# Patient Record
Sex: MALE | Race: ASIAN | Employment: FULL TIME | URBAN - METROPOLITAN AREA
[De-identification: names, ages, dates, MRNs, and addresses within clinical notes are randomized per-mention and may not be internally consistent; named-entity substitution may affect disease eponyms.]

---

## 2018-07-03 ENCOUNTER — OFFICE VISIT (OUTPATIENT)
Dept: INTERNAL MEDICINE CLINIC | Facility: CLINIC | Age: 33
End: 2018-07-03

## 2018-07-03 ENCOUNTER — APPOINTMENT (OUTPATIENT)
Dept: LAB | Facility: HOSPITAL | Age: 33
End: 2018-07-03
Attending: INTERNAL MEDICINE
Payer: COMMERCIAL

## 2018-07-03 VITALS
WEIGHT: 169 LBS | TEMPERATURE: 98 F | BODY MASS INDEX: 23.93 KG/M2 | SYSTOLIC BLOOD PRESSURE: 110 MMHG | HEIGHT: 70.5 IN | RESPIRATION RATE: 17 BRPM | DIASTOLIC BLOOD PRESSURE: 60 MMHG | OXYGEN SATURATION: 98 % | HEART RATE: 76 BPM

## 2018-07-03 DIAGNOSIS — K21.9 GASTROESOPHAGEAL REFLUX DISEASE, ESOPHAGITIS PRESENCE NOT SPECIFIED: ICD-10-CM

## 2018-07-03 DIAGNOSIS — G89.29 CHRONIC LUQ PAIN: Primary | ICD-10-CM

## 2018-07-03 DIAGNOSIS — K58.8 OTHER IRRITABLE BOWEL SYNDROME: ICD-10-CM

## 2018-07-03 DIAGNOSIS — B02.29 POSTHERPETIC NEURALGIA: ICD-10-CM

## 2018-07-03 DIAGNOSIS — R10.12 CHRONIC LUQ PAIN: Primary | ICD-10-CM

## 2018-07-03 PROBLEM — Z86.19 HISTORY OF HERPES ZOSTER: Status: ACTIVE | Noted: 2018-07-03

## 2018-07-03 LAB
ALBUMIN SERPL BCP-MCNC: 4.8 G/DL (ref 3.5–4.8)
ALBUMIN/GLOB SERPL: 2 {RATIO} (ref 1–2)
ALP SERPL-CCNC: 56 U/L (ref 32–100)
ALT SERPL-CCNC: 22 U/L (ref 17–63)
ANION GAP SERPL CALC-SCNC: 10 MMOL/L (ref 0–18)
AST SERPL-CCNC: 25 U/L (ref 15–41)
BASOPHILS # BLD: 0 K/UL (ref 0–0.2)
BASOPHILS NFR BLD: 0 %
BILIRUB SERPL-MCNC: 1.3 MG/DL (ref 0.3–1.2)
BUN SERPL-MCNC: 13 MG/DL (ref 8–20)
BUN/CREAT SERPL: 13.4 (ref 10–20)
CALCIUM SERPL-MCNC: 9.6 MG/DL (ref 8.5–10.5)
CHLORIDE SERPL-SCNC: 104 MMOL/L (ref 95–110)
CO2 SERPL-SCNC: 27 MMOL/L (ref 22–32)
CREAT SERPL-MCNC: 0.97 MG/DL (ref 0.5–1.5)
EOSINOPHIL # BLD: 0.2 K/UL (ref 0–0.7)
EOSINOPHIL NFR BLD: 3 %
ERYTHROCYTE [DISTWIDTH] IN BLOOD BY AUTOMATED COUNT: 13.5 % (ref 11–15)
GLOBULIN PLAS-MCNC: 2.4 G/DL (ref 2.5–3.7)
GLUCOSE SERPL-MCNC: 80 MG/DL (ref 70–99)
HCT VFR BLD AUTO: 46.9 % (ref 41–52)
HGB BLD-MCNC: 15.8 G/DL (ref 13.5–17.5)
LYMPHOCYTES # BLD: 1.8 K/UL (ref 1–4)
LYMPHOCYTES NFR BLD: 33 %
MCH RBC QN AUTO: 30.8 PG (ref 27–32)
MCHC RBC AUTO-ENTMCNC: 33.8 G/DL (ref 32–37)
MCV RBC AUTO: 91.2 FL (ref 80–100)
MONOCYTES # BLD: 0.4 K/UL (ref 0–1)
MONOCYTES NFR BLD: 7 %
NEUTROPHILS # BLD AUTO: 3.2 K/UL (ref 1.8–7.7)
NEUTROPHILS NFR BLD: 57 %
OSMOLALITY UR CALC.SUM OF ELEC: 291 MOSM/KG (ref 275–295)
PATIENT FASTING: NO
PLATELET # BLD AUTO: 198 K/UL (ref 140–400)
PMV BLD AUTO: 8.3 FL (ref 7.4–10.3)
POTASSIUM SERPL-SCNC: 4.5 MMOL/L (ref 3.3–5.1)
PROT SERPL-MCNC: 7.2 G/DL (ref 5.9–8.4)
RBC # BLD AUTO: 5.14 M/UL (ref 4.5–5.9)
SODIUM SERPL-SCNC: 141 MMOL/L (ref 136–144)
WBC # BLD AUTO: 5.6 K/UL (ref 4–11)

## 2018-07-03 PROCEDURE — 36415 COLL VENOUS BLD VENIPUNCTURE: CPT | Performed by: INTERNAL MEDICINE

## 2018-07-03 PROCEDURE — 99204 OFFICE O/P NEW MOD 45 MIN: CPT | Performed by: INTERNAL MEDICINE

## 2018-07-03 PROCEDURE — 83013 H PYLORI (C-13) BREATH: CPT

## 2018-07-03 PROCEDURE — 85025 COMPLETE CBC W/AUTO DIFF WBC: CPT | Performed by: INTERNAL MEDICINE

## 2018-07-03 PROCEDURE — 80053 COMPREHEN METABOLIC PANEL: CPT | Performed by: INTERNAL MEDICINE

## 2018-07-03 RX ORDER — GABAPENTIN 300 MG/1
300 CAPSULE ORAL NIGHTLY
Qty: 60 CAPSULE | Refills: 0 | Status: SHIPPED | OUTPATIENT
Start: 2018-07-03 | End: 2018-08-15 | Stop reason: ALTCHOICE

## 2018-07-03 RX ORDER — OMEPRAZOLE 20 MG/1
20 CAPSULE, DELAYED RELEASE ORAL EVERY MORNING
Qty: 30 CAPSULE | Refills: 0 | Status: SHIPPED | OUTPATIENT
Start: 2018-07-03 | End: 2018-08-24

## 2018-07-03 NOTE — PROGRESS NOTES
Patient presented in office today with abdominal pain  Patient states that the pain is at a 3 on the pain scale   Patient states that the pain feels like a pinching and squeezing ULQ of abdomin.   No vomiting no nausea- bowel movements are normal as well  P

## 2018-07-03 NOTE — PROGRESS NOTES
HPI:    Patient ID: Lebron Steel is a 28year old male. HPI   Patient is here to establish care. Presents today with chronic LUQ pain for > 2 yrs. H/o  shingles > 10 yrs ago involving left lower rib area.   Pain is constant, sharp , scaling 4-5/10, ra Neck: Normal range of motion. No JVD present. No thyromegaly present. Cardiovascular: Normal rate, regular rhythm and normal heart sounds. Pulmonary/Chest: Effort normal and breath sounds normal.   Abdominal: Soft.  Bowel sounds are normal. He exhibi

## 2018-07-03 NOTE — PROGRESS NOTES
Non fasting blood draw administered in left arm cbc,cmp  Draw successful   Ras:  cbc,cmp  D. O.B verified   Ordering Dr: Lynn Mukherjee

## 2018-07-04 LAB — H. PYLORI BREATH TEST: NEGATIVE

## 2018-07-09 ENCOUNTER — TELEPHONE (OUTPATIENT)
Dept: INTERNAL MEDICINE CLINIC | Facility: CLINIC | Age: 33
End: 2018-07-09

## 2018-07-10 NOTE — TELEPHONE ENCOUNTER
Patient came in yesterday informed him of the status of the CT   Patient verbalized knowledge and understanding

## 2018-07-11 ENCOUNTER — TELEPHONE (OUTPATIENT)
Dept: INTERNAL MEDICINE CLINIC | Facility: CLINIC | Age: 33
End: 2018-07-11

## 2018-07-11 NOTE — TELEPHONE ENCOUNTER
Called [facility], their representative stated I am not authorized to discussed the case of Josselyn Knight. They have another MD on  file who is authorized.

## 2018-07-12 DIAGNOSIS — R10.11 RIGHT UPPER QUADRANT ABDOMINAL PAIN: Primary | ICD-10-CM

## 2018-07-12 NOTE — TELEPHONE ENCOUNTER
Talked to Yari at [facility] to get approval for this test   awaiting for the approval

## 2018-07-13 ENCOUNTER — HOSPITAL ENCOUNTER (OUTPATIENT)
Dept: ULTRASOUND IMAGING | Facility: HOSPITAL | Age: 33
Discharge: HOME | End: 2018-07-13
Attending: INTERNAL MEDICINE
Payer: COMMERCIAL

## 2018-07-13 ENCOUNTER — APPOINTMENT (OUTPATIENT)
Dept: LAB | Facility: HOSPITAL | Age: 33
End: 2018-07-13
Attending: INTERNAL MEDICINE
Payer: COMMERCIAL

## 2018-07-13 ENCOUNTER — NURSE ONLY (OUTPATIENT)
Dept: INTERNAL MEDICINE CLINIC | Facility: CLINIC | Age: 33
End: 2018-07-13

## 2018-07-13 DIAGNOSIS — R10.11 RIGHT UPPER QUADRANT ABDOMINAL PAIN: ICD-10-CM

## 2018-07-13 DIAGNOSIS — R10.12 LEFT UPPER QUADRANT PAIN: Primary | ICD-10-CM

## 2018-07-13 DIAGNOSIS — R10.12 LEFT UPPER QUADRANT PAIN: ICD-10-CM

## 2018-07-13 LAB
ALBUMIN SERPL BCP-MCNC: 4.8 G/DL (ref 3.5–4.8)
ALP SERPL-CCNC: 62 U/L (ref 32–100)
ALT SERPL-CCNC: 19 U/L (ref 17–63)
AMYLASE SERPL-CCNC: 43 U/L (ref 24–108)
AST SERPL-CCNC: 24 U/L (ref 15–41)
BILIRUB DIRECT SERPL-MCNC: 0.2 MG/DL (ref 0–0.2)
BILIRUB SERPL-MCNC: 1.4 MG/DL (ref 0.3–1.2)
BILIRUB UR QL: NEGATIVE
CLARITY UR: CLEAR
COLOR UR: YELLOW
GLUCOSE UR-MCNC: NEGATIVE MG/DL
HGB UR QL STRIP.AUTO: NEGATIVE
KETONES UR-MCNC: NEGATIVE MG/DL
LEUKOCYTE ESTERASE UR QL STRIP.AUTO: NEGATIVE
LIPASE SERPL-CCNC: 33 U/L (ref 22–51)
NITRITE UR QL STRIP.AUTO: NEGATIVE
PH UR: 5 [PH] (ref 5–8)
PROT SERPL-MCNC: 7.5 G/DL (ref 5.9–8.4)
PROT UR-MCNC: NEGATIVE MG/DL
SP GR UR STRIP: 1.02 (ref 1–1.03)
UROBILINOGEN UR STRIP-ACNC: <2
VIT C UR-MCNC: NEGATIVE MG/DL

## 2018-07-13 PROCEDURE — 81003 URINALYSIS AUTO W/O SCOPE: CPT

## 2018-07-13 PROCEDURE — 36415 COLL VENOUS BLD VENIPUNCTURE: CPT

## 2018-07-13 PROCEDURE — 83690 ASSAY OF LIPASE: CPT

## 2018-07-13 PROCEDURE — 82150 ASSAY OF AMYLASE: CPT

## 2018-07-13 PROCEDURE — 76700 US EXAM ABDOM COMPLETE: CPT | Performed by: INTERNAL MEDICINE

## 2018-07-13 PROCEDURE — 80076 HEPATIC FUNCTION PANEL: CPT

## 2018-07-13 PROCEDURE — 36415 COLL VENOUS BLD VENIPUNCTURE: CPT | Performed by: INTERNAL MEDICINE

## 2018-07-13 NOTE — PROGRESS NOTES
Patient came in to see if I can get him scheduled for the U/S today he went to the [facility] for blood draw today as well so that we can get an approval for CT of the abdomin. U/S was scheduled for 900 am today. Guilherme Dudley

## 2018-07-22 ENCOUNTER — HOSPITAL ENCOUNTER (OUTPATIENT)
Dept: CT IMAGING | Facility: HOSPITAL | Age: 33
Discharge: HOME | End: 2018-07-22
Attending: INTERNAL MEDICINE
Payer: COMMERCIAL

## 2018-07-22 DIAGNOSIS — G89.29 CHRONIC LUQ PAIN: ICD-10-CM

## 2018-07-22 DIAGNOSIS — R10.12 CHRONIC LUQ PAIN: ICD-10-CM

## 2018-07-22 LAB — CREAT BLD-MCNC: 1 MG/DL (ref 0.5–1.5)

## 2018-07-22 PROCEDURE — 74160 CT ABDOMEN W/CONTRAST: CPT | Performed by: INTERNAL MEDICINE

## 2018-07-22 PROCEDURE — 82565 ASSAY OF CREATININE: CPT

## 2018-07-26 ENCOUNTER — OFFICE VISIT (OUTPATIENT)
Dept: INTERNAL MEDICINE CLINIC | Facility: CLINIC | Age: 33
End: 2018-07-26
Payer: COMMERCIAL

## 2018-07-26 VITALS
TEMPERATURE: 98 F | BODY MASS INDEX: 25.06 KG/M2 | HEART RATE: 101 BPM | OXYGEN SATURATION: 98 % | HEIGHT: 70.5 IN | DIASTOLIC BLOOD PRESSURE: 60 MMHG | SYSTOLIC BLOOD PRESSURE: 124 MMHG | WEIGHT: 177 LBS | RESPIRATION RATE: 20 BRPM

## 2018-07-26 DIAGNOSIS — K21.9 GASTROESOPHAGEAL REFLUX DISEASE, ESOPHAGITIS PRESENCE NOT SPECIFIED: Primary | ICD-10-CM

## 2018-07-26 DIAGNOSIS — N46.9 INFERTILITY MALE: ICD-10-CM

## 2018-07-26 DIAGNOSIS — S93.422A SPRAIN OF LEFT MEDIAL ANKLE JOINT, INITIAL ENCOUNTER: ICD-10-CM

## 2018-07-26 PROCEDURE — 99214 OFFICE O/P EST MOD 30 MIN: CPT | Performed by: INTERNAL MEDICINE

## 2018-07-27 PROBLEM — K21.9 GERD (GASTROESOPHAGEAL REFLUX DISEASE): Status: ACTIVE | Noted: 2018-07-27

## 2018-07-27 NOTE — PATIENT INSTRUCTIONS
Understanding Ankle Sprain    The ankle is the joint where the leg and foot meet. Bones are held in place by connective tissue called ligaments. When ankle ligaments are stretched to the point of pain and injury, it is called an ankle sprain.  A sprain ca · Heat packs. These may be recommended before doing ankle exercises. Possible complications of an ankle sprain  An ankle that has been weakened by a sprain can be more likely to have repeated sprains afterward.  Doing exercises to strengthen your ankle and · Stop smoking   Date Last Reviewed: 7/1/2016  © 5883-4761 [employer]. [address] [facility] [place] [facility] [place]. All rights reserved. This information is not intended as a substitute for professional medical care.  Always follow your healthc

## 2018-08-11 ENCOUNTER — APPOINTMENT (OUTPATIENT)
Dept: LAB | Facility: HOSPITAL | Age: 33
End: 2018-08-11
Attending: INTERNAL MEDICINE
Payer: COMMERCIAL

## 2018-08-11 DIAGNOSIS — N46.9 INFERTILITY MALE: ICD-10-CM

## 2018-08-11 LAB
PH SMN: 6.8 [PH] (ref 7.2–8.3)
SPECIMEN VOL SMN: 5 ML (ref 1.5–6)
SPERM # SMN: 283.1 MILL/ML (ref 15–150)
SPERM IMMOTILE NFR SMN: 4 %
SPERM IMMOTILE NFR SMN: 75 %
SPERM PROG NFR SMN: 21 % (ref 32–100)

## 2018-08-11 PROCEDURE — 89320 SEMEN ANAL VOL/COUNT/MOT: CPT

## 2018-08-15 ENCOUNTER — OFFICE VISIT (OUTPATIENT)
Dept: INTERNAL MEDICINE CLINIC | Facility: CLINIC | Age: 33
End: 2018-08-15
Payer: COMMERCIAL

## 2018-08-15 ENCOUNTER — HOSPITAL ENCOUNTER (OUTPATIENT)
Dept: GENERAL RADIOLOGY | Facility: HOSPITAL | Age: 33
Discharge: HOME | End: 2018-08-15
Attending: INTERNAL MEDICINE
Payer: COMMERCIAL

## 2018-08-15 VITALS
BODY MASS INDEX: 24.07 KG/M2 | OXYGEN SATURATION: 99 % | RESPIRATION RATE: 20 BRPM | TEMPERATURE: 98 F | DIASTOLIC BLOOD PRESSURE: 70 MMHG | HEART RATE: 78 BPM | WEIGHT: 170 LBS | HEIGHT: 70.5 IN | SYSTOLIC BLOOD PRESSURE: 122 MMHG

## 2018-08-15 DIAGNOSIS — N46.9 INFERTILITY MALE: ICD-10-CM

## 2018-08-15 DIAGNOSIS — M76.62 ACHILLES TENDINITIS OF LEFT LOWER EXTREMITY: ICD-10-CM

## 2018-08-15 DIAGNOSIS — K21.9 GASTROESOPHAGEAL REFLUX DISEASE, ESOPHAGITIS PRESENCE NOT SPECIFIED: ICD-10-CM

## 2018-08-15 DIAGNOSIS — K21.9 GASTROESOPHAGEAL REFLUX DISEASE, ESOPHAGITIS PRESENCE NOT SPECIFIED: Primary | ICD-10-CM

## 2018-08-15 DIAGNOSIS — N50.82 SCROTAL PAIN: ICD-10-CM

## 2018-08-15 PROCEDURE — 73630 X-RAY EXAM OF FOOT: CPT | Performed by: INTERNAL MEDICINE

## 2018-08-15 PROCEDURE — 99214 OFFICE O/P EST MOD 30 MIN: CPT | Performed by: INTERNAL MEDICINE

## 2018-08-15 RX ORDER — METHYLPREDNISOLONE 4 MG/1
TABLET ORAL
Qty: 1 KIT | Refills: 0 | Status: SHIPPED | OUTPATIENT
Start: 2018-08-15 | End: 2018-09-17 | Stop reason: ALTCHOICE

## 2018-08-15 NOTE — PROGRESS NOTES
HPI:    Patient ID: Dain Couch is a 28year old male. HPI   Patient presents today c/o left foot pain. He is now walking with a cane. It started 3 weeks ago when he noticed left ankle pain after stretching and exercise. Pain was mild initially .  It Eyes: Conjunctivae and EOM are normal. Pupils are equal, round, and reactive to light. No scleral icterus. Neck: Normal range of motion. Neck supple. No JVD present.    Cardiovascular: Normal rate, regular rhythm, normal heart sounds and intact distal pul Avoid acidic , fried food, caffeine, alcohol. Frequent small feedings instead of heavy meals. No food 3-4 hs before bedtime. Keep HOB elevated during sleep. H pylori negative. Plan on EGD if symptoms not resolved.    Discussed possibility of IBD   Food l

## 2018-08-20 ENCOUNTER — TELEPHONE (OUTPATIENT)
Dept: INTERNAL MEDICINE CLINIC | Facility: CLINIC | Age: 33
End: 2018-08-20

## 2018-08-20 NOTE — TELEPHONE ENCOUNTER
Pt LM about his appt with Dr Tone Lind not until Oct 1, and he wants to know if the Dr can get him sooner?

## 2018-08-24 RX ORDER — OMEPRAZOLE 20 MG/1
20 CAPSULE, DELAYED RELEASE ORAL EVERY MORNING
Qty: 30 CAPSULE | Refills: 0 | Status: SHIPPED
Start: 2018-08-24 | End: 2018-08-25

## 2018-08-24 NOTE — TELEPHONE ENCOUNTER
From: Lynn Lovell  Sent: 8/24/2018 12:11 PM CDT  Subject: Medication Renewal Request    Lynn Lovell would like a refill of the following medications:     omeprazole 20 MG Oral Capsule Delayed Release Chayito BARNES MD]    Preferred pharmacy: [facility]/PHARMACY #

## 2018-08-25 RX ORDER — OMEPRAZOLE 20 MG/1
CAPSULE, DELAYED RELEASE ORAL
Qty: 30 CAPSULE | Refills: 0 | Status: SHIPPED | OUTPATIENT
Start: 2018-08-25 | End: 2018-10-22

## 2018-08-28 ENCOUNTER — TELEPHONE (OUTPATIENT)
Dept: INTERNAL MEDICINE CLINIC | Facility: CLINIC | Age: 33
End: 2018-08-28

## 2018-09-11 ENCOUNTER — OFFICE VISIT (OUTPATIENT)
Dept: PODIATRY CLINIC | Facility: CLINIC | Age: 33
End: 2018-09-11
Payer: COMMERCIAL

## 2018-09-11 DIAGNOSIS — M10.072 IDIOPATHIC GOUT OF LEFT FOOT, UNSPECIFIED CHRONICITY: Primary | ICD-10-CM

## 2018-09-11 PROCEDURE — 99998 NO SHOW: CPT | Performed by: PODIATRIST

## 2018-09-11 PROCEDURE — 99212 OFFICE O/P EST SF 10 MIN: CPT | Performed by: PODIATRIST

## 2018-09-11 PROCEDURE — 99243 OFF/OP CNSLTJ NEW/EST LOW 30: CPT | Performed by: PODIATRIST

## 2018-09-11 NOTE — PROGRESS NOTES
HPI:    Patient ID: Lebron Steel is a 28year old male. HPI  This pleasant 44-year-old male presents as a new patient to me on consult from [address] [place] [address]. patient's chief complaint was left foot pain.   Patient states that approximately 6-8 weeks ago he had sever the etiology, progression, and considerations of care if recurrent. Based on present relief of all symptoms I recommend no further care.   If in fact he has a sense of recurrence I encouraged follow-up immediately and I would recommend that labs be done to

## 2018-09-17 ENCOUNTER — OFFICE VISIT (OUTPATIENT)
Dept: INTERNAL MEDICINE CLINIC | Facility: CLINIC | Age: 33
End: 2018-09-17
Payer: COMMERCIAL

## 2018-09-17 VITALS
HEIGHT: 70.5 IN | BODY MASS INDEX: 24.21 KG/M2 | WEIGHT: 171 LBS | SYSTOLIC BLOOD PRESSURE: 126 MMHG | HEART RATE: 61 BPM | TEMPERATURE: 98 F | DIASTOLIC BLOOD PRESSURE: 80 MMHG | OXYGEN SATURATION: 98 % | RESPIRATION RATE: 19 BRPM

## 2018-09-17 DIAGNOSIS — M25.50 POLYARTHRALGIA: Primary | ICD-10-CM

## 2018-09-17 DIAGNOSIS — N46.9 INFERTILITY MALE: ICD-10-CM

## 2018-09-17 DIAGNOSIS — I86.1 VARICOCELE: ICD-10-CM

## 2018-09-17 LAB
ERYTHROCYTE [SEDIMENTATION RATE] IN BLOOD: 4 MM/HR (ref 0–15)
RHEUMATOID FACT SER QL: <5 IU/ML
URATE SERPL-MCNC: 6.3 MG/DL (ref 3.3–8.7)

## 2018-09-17 PROCEDURE — 85652 RBC SED RATE AUTOMATED: CPT | Performed by: INTERNAL MEDICINE

## 2018-09-17 PROCEDURE — 36415 COLL VENOUS BLD VENIPUNCTURE: CPT | Performed by: INTERNAL MEDICINE

## 2018-09-17 PROCEDURE — 84550 ASSAY OF BLOOD/URIC ACID: CPT | Performed by: INTERNAL MEDICINE

## 2018-09-17 PROCEDURE — 99213 OFFICE O/P EST LOW 20 MIN: CPT | Performed by: INTERNAL MEDICINE

## 2018-09-17 PROCEDURE — 86200 CCP ANTIBODY: CPT | Performed by: INTERNAL MEDICINE

## 2018-09-17 PROCEDURE — 86038 ANTINUCLEAR ANTIBODIES: CPT | Performed by: INTERNAL MEDICINE

## 2018-09-17 PROCEDURE — 86431 RHEUMATOID FACTOR QUANT: CPT | Performed by: INTERNAL MEDICINE

## 2018-09-18 NOTE — PROGRESS NOTES
Non fasting blood draw administered in left arm   Draw successful   Ras:  Fany,cyclic,rf,esr,uric acid  D. O.B verified   Ordering Dr: Adroe Weber

## 2018-09-19 LAB — CCP IGG SERPL-ACNC: 0.9 U/ML (ref 0–6.9)

## 2018-09-20 LAB — NUCLEAR IGG TITR SER IF: NEGATIVE {TITER}

## 2018-09-24 ENCOUNTER — TELEPHONE (OUTPATIENT)
Dept: INTERNAL MEDICINE CLINIC | Facility: CLINIC | Age: 33
End: 2018-09-24

## 2018-09-24 NOTE — TELEPHONE ENCOUNTER
Pt can't get in to see the Urology until 10/24/18, and wants to know if the Dr can get him in sooner. Also, he is still having pain in his L ankle, and wants to know if the Dr can do anything about it.

## 2018-10-09 ENCOUNTER — TELEPHONE (OUTPATIENT)
Dept: INTERNAL MEDICINE CLINIC | Facility: CLINIC | Age: 33
End: 2018-10-09

## 2018-10-09 NOTE — TELEPHONE ENCOUNTER
Pt called would like to talk to dr. Cecilia Duggan regarding a bill.  States his insurance did not cover Fertility semen Analysis

## 2018-10-22 RX ORDER — OMEPRAZOLE 20 MG/1
CAPSULE, DELAYED RELEASE ORAL
Qty: 90 CAPSULE | Refills: 0 | Status: SHIPPED | OUTPATIENT
Start: 2018-10-22 | End: 2019-05-31

## 2019-01-02 NOTE — PROGRESS NOTES
HPI:    Patient ID: Margareth Johnson is a 28year old male. HPI    Last seen for persistent LUQ pain . Here to discuss CT. C/o left ankle pain after stretching and exercise yesterday,.. Pain is 4-5/10 No swelling.     Requesting sperm analysis  H/o inadequ ONLY) (CPT=74160)   CONCLUSION:   1. Nonspecific gastric wall thickening may relate to underlying gastritis in the appropriate clinical setting. 2. Otherwise, no acute intra-abdominal process is identified.  No additional etiology of the patient's sympt no

## 2019-02-14 ENCOUNTER — OFFICE VISIT (OUTPATIENT)
Dept: INTERNAL MEDICINE CLINIC | Facility: CLINIC | Age: 34
End: 2019-02-14
Payer: COMMERCIAL

## 2019-02-14 VITALS
HEIGHT: 70.5 IN | WEIGHT: 170 LBS | DIASTOLIC BLOOD PRESSURE: 66 MMHG | SYSTOLIC BLOOD PRESSURE: 96 MMHG | BODY MASS INDEX: 24.07 KG/M2

## 2019-02-14 DIAGNOSIS — Z20.1 EXPOSURE TO TB: ICD-10-CM

## 2019-02-14 DIAGNOSIS — K21.9 GASTROESOPHAGEAL REFLUX DISEASE, ESOPHAGITIS PRESENCE NOT SPECIFIED: Primary | ICD-10-CM

## 2019-02-14 DIAGNOSIS — R07.89 OTHER CHEST PAIN: ICD-10-CM

## 2019-02-14 PROCEDURE — 99214 OFFICE O/P EST MOD 30 MIN: CPT | Performed by: INTERNAL MEDICINE

## 2019-02-14 NOTE — PATIENT INSTRUCTIONS
Gout Diet  Gout is a painful condition caused by an excess of uric acid, a waste product made by the body. Uric acid forms crystals that collect in the joints. The immune response to these crystals brings on symptoms of joint pain and swelling.  This is c · Dairy products that are low-fat or fat-free, such as cheese and yogurt  · Complex carbohydrate foods, including whole grains, brown rice, oats, and beans  · Coffee, in moderation  · Water, approximately 64 ounces per day  Follow-up care  Follow up with marco

## 2019-02-14 NOTE — PROGRESS NOTES
HPI:    Patient ID: Maco Allen is a 35year old male. HPI   Patient c/o persistent LUQ pain. He awakens him early am , usually around 4 am. Pain radiated to upper part of the his back. Pain is worsen after eating certain food, the night before.  It can Neck supple. No JVD present. Cardiovascular: Normal rate, regular rhythm and normal heart sounds. Exam reveals no gallop and no friction rub. Pulmonary/Chest: Effort normal and breath sounds normal.   Abdominal: Soft.  Bowel sounds are normal. He exhi

## 2019-04-22 ENCOUNTER — LAB ENCOUNTER (OUTPATIENT)
Dept: LAB | Age: 34
End: 2019-04-22
Attending: INTERNAL MEDICINE
Payer: COMMERCIAL

## 2019-04-22 DIAGNOSIS — R10.12 LEFT UPPER QUADRANT PAIN: Primary | ICD-10-CM

## 2019-04-22 PROCEDURE — 86140 C-REACTIVE PROTEIN: CPT

## 2019-04-22 PROCEDURE — 83690 ASSAY OF LIPASE: CPT

## 2019-04-22 PROCEDURE — 84443 ASSAY THYROID STIM HORMONE: CPT

## 2019-04-22 PROCEDURE — 86235 NUCLEAR ANTIGEN ANTIBODY: CPT

## 2019-04-22 PROCEDURE — 84439 ASSAY OF FREE THYROXINE: CPT

## 2019-04-22 PROCEDURE — 86225 DNA ANTIBODY NATIVE: CPT

## 2019-04-22 PROCEDURE — 86038 ANTINUCLEAR ANTIBODIES: CPT

## 2019-05-23 ENCOUNTER — OFFICE VISIT (OUTPATIENT)
Dept: INTERNAL MEDICINE CLINIC | Facility: CLINIC | Age: 34
End: 2019-05-23
Payer: COMMERCIAL

## 2019-05-23 VITALS
DIASTOLIC BLOOD PRESSURE: 60 MMHG | HEART RATE: 73 BPM | SYSTOLIC BLOOD PRESSURE: 110 MMHG | RESPIRATION RATE: 18 BRPM | BODY MASS INDEX: 23 KG/M2 | WEIGHT: 165 LBS | OXYGEN SATURATION: 95 %

## 2019-05-23 DIAGNOSIS — M54.89 BACK PAIN WITHOUT SCIATICA: Primary | ICD-10-CM

## 2019-05-23 DIAGNOSIS — K21.9 GASTROESOPHAGEAL REFLUX DISEASE, ESOPHAGITIS PRESENCE NOT SPECIFIED: ICD-10-CM

## 2019-05-23 DIAGNOSIS — R63.4 WEIGHT LOSS: ICD-10-CM

## 2019-05-23 DIAGNOSIS — R76.8 ANA POSITIVE: ICD-10-CM

## 2019-05-23 PROCEDURE — 99214 OFFICE O/P EST MOD 30 MIN: CPT | Performed by: INTERNAL MEDICINE

## 2019-05-23 NOTE — PROGRESS NOTES
HPI:    Patient ID: Tessa Cartwright is a 35year old male. HPI     C/o back pain after eating meat. {ain radiates to thigh. Has no symptoms when eats soup or carbs. Appetite is  normal. Seen GI , Dr. Marcy Matute several occasions for dyspepsia.  EGD in 2016 sh Normal rate, regular rhythm and normal heart sounds. Pulmonary/Chest: Effort normal and breath sounds normal.   Abdominal: Soft. Bowel sounds are normal. He exhibits no distension. There is no hepatosplenomegaly. There is no tenderness.    Garcia Belling (CPT=72110); Future  - RHEUMATOLOGY - INTERNAL    2. BALDO positive    - RHEUMATOLOGY - INTERNAL    3.  Gastroesophageal reflux disease, esophagitis presence not specified  Antireflux measures such as dietary changes and lifestyle modifications were discussed

## 2019-10-08 ENCOUNTER — OFFICE VISIT (OUTPATIENT)
Dept: INTERNAL MEDICINE CLINIC | Facility: CLINIC | Age: 34
End: 2019-10-08
Payer: COMMERCIAL

## 2019-10-08 ENCOUNTER — TELEPHONE (OUTPATIENT)
Dept: INTERNAL MEDICINE CLINIC | Facility: CLINIC | Age: 34
End: 2019-10-08

## 2019-10-08 VITALS
HEART RATE: 68 BPM | BODY MASS INDEX: 22.48 KG/M2 | SYSTOLIC BLOOD PRESSURE: 118 MMHG | HEIGHT: 70 IN | DIASTOLIC BLOOD PRESSURE: 60 MMHG | WEIGHT: 157 LBS | OXYGEN SATURATION: 98 % | RESPIRATION RATE: 19 BRPM

## 2019-10-08 DIAGNOSIS — R10.12 CHRONIC LUQ PAIN: ICD-10-CM

## 2019-10-08 DIAGNOSIS — R63.1 POLYDIPSIA: Primary | ICD-10-CM

## 2019-10-08 DIAGNOSIS — G89.29 CHRONIC LUQ PAIN: ICD-10-CM

## 2019-10-08 DIAGNOSIS — R63.4 WEIGHT LOSS: ICD-10-CM

## 2019-10-08 DIAGNOSIS — R76.8 ANA POSITIVE: ICD-10-CM

## 2019-10-08 PROCEDURE — 99214 OFFICE O/P EST MOD 30 MIN: CPT | Performed by: INTERNAL MEDICINE

## 2019-10-08 PROCEDURE — 36415 COLL VENOUS BLD VENIPUNCTURE: CPT | Performed by: INTERNAL MEDICINE

## 2019-10-08 NOTE — PROGRESS NOTES
Non fasting blood draw administered in left arm   Draw successful   Ras:  Insulin,A1C,cmp  D. O.B verified   Ordering Dr: Anoop Ramirez

## 2019-10-08 NOTE — PROGRESS NOTES
HPI:    Patient ID: Rupinder Whitney is a 35year old male. HPI  Patient is here with multiple issues. Headache , dizziness , increase thirst after eating high energy food such as chocolate , sugary treats ( mooncake) and sugary drinks. Urine is dark yello scleral icterus. Neck: Normal range of motion. Neck supple. No JVD present. Cardiovascular: Normal rate, regular rhythm and normal heart sounds. Pulmonary/Chest: Effort normal and breath sounds normal.   Abdominal: Soft.  Bowel sounds are normal. He <100 AU/mL <100      AST (SGOT)      15 - 41 U/L   24    ALT (SGPT)      17 - 63 U/L   19    ALKALINE PHOSPHATASE      32 - 100 U/L   62    Total Bilirubin      0.3 - 1.2 mg/dL   1.4 (H)    TOTAL PROTEIN      5.9 - 8.4 g/dL   7.5    Albumin      3.5 - 4.8

## 2019-11-19 ENCOUNTER — OFFICE VISIT (OUTPATIENT)
Dept: INTERNAL MEDICINE CLINIC | Facility: CLINIC | Age: 34
End: 2019-11-19
Payer: COMMERCIAL

## 2019-11-19 VITALS
WEIGHT: 166 LBS | RESPIRATION RATE: 19 BRPM | OXYGEN SATURATION: 98 % | DIASTOLIC BLOOD PRESSURE: 60 MMHG | HEART RATE: 78 BPM | HEIGHT: 70 IN | BODY MASS INDEX: 23.77 KG/M2 | SYSTOLIC BLOOD PRESSURE: 110 MMHG

## 2019-11-19 DIAGNOSIS — D12.6 ADENOMATOUS POLYP OF COLON, UNSPECIFIED PART OF COLON: ICD-10-CM

## 2019-11-19 DIAGNOSIS — Z11.3 SCREEN FOR STD (SEXUALLY TRANSMITTED DISEASE): ICD-10-CM

## 2019-11-19 DIAGNOSIS — K58.2 IRRITABLE BOWEL SYNDROME WITH BOTH CONSTIPATION AND DIARRHEA: ICD-10-CM

## 2019-11-19 DIAGNOSIS — Z00.00 GENERAL MEDICAL EXAM: Primary | ICD-10-CM

## 2019-11-19 DIAGNOSIS — R76.8 ANA POSITIVE: ICD-10-CM

## 2019-11-19 PROCEDURE — 99395 PREV VISIT EST AGE 18-39: CPT | Performed by: INTERNAL MEDICINE

## 2019-11-19 PROCEDURE — 36415 COLL VENOUS BLD VENIPUNCTURE: CPT | Performed by: INTERNAL MEDICINE

## 2019-11-19 RX ORDER — CLOTRIMAZOLE 1 %
CREAM (GRAM) TOPICAL
COMMUNITY
Start: 2019-11-05 | End: 2019-11-26

## 2019-11-19 NOTE — PROGRESS NOTES
Patient presented in office today for fasting blood draw. Blood draw successful in left arm  Ras:  Lipid,hiv,gc,hep b,trep  D. O.B verified   Orders per Doctor Co

## 2019-11-19 NOTE — PROGRESS NOTES
Orrie Primrose is a 29year old male who presents for a complete physical exam.     HPI:   Pt has intermittent abdomina pain with  chronic constipation due to IBS . He had seen GI, Dr. Ketan Hampton  . Prescribed hyoscyamine and Magcitrate.   Takes Mitalax pr ST  LUNGS: denies shortness of breath with exertion  CARDIOVASCULAR: denies chest pain on exertion  GI: intermittent constipation. Diarrhea with Miralax.    : denies nocturia or changes in stream  MUSCULOSKELETAL: denies back pain  NEURO: denies headaches and agrees to the plan. The patient is asked to return for f/u in 6 mos and  CPX in 1 yr.

## 2019-11-20 NOTE — PATIENT INSTRUCTIONS
Healthy Diet and Regular Exercise  [employer] [facility] [employer] for making healthy food choices    Enjoy your food, but eat less. Fully enjoy your food when eating. Don’t eat while distracted and slow down. Avoid over sized portions.    Don’t

## 2020-03-24 ENCOUNTER — E-VISIT (OUTPATIENT)
Dept: FAMILY MEDICINE CLINIC | Facility: CLINIC | Age: 35
End: 2020-03-24

## 2020-03-24 DIAGNOSIS — M54.50 ACUTE LOW BACK PAIN, UNSPECIFIED BACK PAIN LATERALITY, UNSPECIFIED WHETHER SCIATICA PRESENT: Primary | ICD-10-CM

## 2020-03-24 RX ORDER — NAPROXEN SODIUM 550 MG/1
550 TABLET ORAL 2 TIMES DAILY WITH MEALS
Qty: 28 TABLET | Refills: 0 | Status: SHIPPED | OUTPATIENT
Start: 2020-03-24 | End: 2020-04-07

## 2020-03-24 RX ORDER — CYCLOBENZAPRINE HCL 5 MG
5 TABLET ORAL 3 TIMES DAILY PRN
Qty: 15 TABLET | Refills: 0 | Status: SHIPPED | OUTPATIENT
Start: 2020-03-24 | End: 2020-03-29

## 2020-03-24 NOTE — PROGRESS NOTES
Shantanu Venegas is a 29year old male. HPI:   See answers to questions above.      Current Outpatient Medications   Medication Sig Dispense Refill   • cyclobenzaprine 5 MG Oral Tab Take 1 tablet (5 mg total) by mouth 3 (three) times daily as needed for Muscle

## 2020-03-27 ENCOUNTER — TELEPHONE (OUTPATIENT)
Dept: INTERNAL MEDICINE CLINIC | Facility: CLINIC | Age: 35
End: 2020-03-27

## 2020-03-27 DIAGNOSIS — M53.3 COCCYDYNIA: ICD-10-CM

## 2020-03-27 DIAGNOSIS — T14.8XXA MUSCLE STRAIN: Primary | ICD-10-CM

## 2020-03-27 PROCEDURE — 99213 OFFICE O/P EST LOW 20 MIN: CPT | Performed by: INTERNAL MEDICINE

## 2020-03-27 NOTE — TELEPHONE ENCOUNTER
Virtual/Telephone Check-In    Harini Patel verbally consents to a Virtual/Telephone Check-In service on 03/27/20. Patient understands and accepts financial responsibility for any deductible, co-insurance and/or co-pays associated with this service.     Dura

## 2021-05-27 ENCOUNTER — OFFICE VISIT (OUTPATIENT)
Dept: INTERNAL MEDICINE CLINIC | Facility: CLINIC | Age: 36
End: 2021-05-27
Payer: COMMERCIAL

## 2021-05-27 VITALS
OXYGEN SATURATION: 98 % | SYSTOLIC BLOOD PRESSURE: 110 MMHG | HEART RATE: 76 BPM | HEIGHT: 70 IN | BODY MASS INDEX: 20.47 KG/M2 | DIASTOLIC BLOOD PRESSURE: 76 MMHG | WEIGHT: 143 LBS

## 2021-05-27 DIAGNOSIS — D12.5 ADENOMATOUS POLYP OF SIGMOID COLON: ICD-10-CM

## 2021-05-27 DIAGNOSIS — R63.4 WEIGHT LOSS: Primary | ICD-10-CM

## 2021-05-27 DIAGNOSIS — E55.9 VITAMIN D DEFICIENCY: ICD-10-CM

## 2021-05-27 DIAGNOSIS — K58.0 IRRITABLE BOWEL SYNDROME WITH DIARRHEA: ICD-10-CM

## 2021-05-27 DIAGNOSIS — J30.1 HAY FEVER: ICD-10-CM

## 2021-05-27 PROCEDURE — 3074F SYST BP LT 130 MM HG: CPT | Performed by: INTERNAL MEDICINE

## 2021-05-27 PROCEDURE — 3078F DIAST BP <80 MM HG: CPT | Performed by: INTERNAL MEDICINE

## 2021-05-27 PROCEDURE — 3008F BODY MASS INDEX DOCD: CPT | Performed by: INTERNAL MEDICINE

## 2021-05-27 PROCEDURE — 99215 OFFICE O/P EST HI 40 MIN: CPT | Performed by: INTERNAL MEDICINE

## 2021-05-27 PROCEDURE — 36415 COLL VENOUS BLD VENIPUNCTURE: CPT | Performed by: INTERNAL MEDICINE

## 2021-05-27 RX ORDER — MONTELUKAST SODIUM 10 MG/1
10 TABLET ORAL NIGHTLY
Qty: 90 TABLET | Refills: 1 | Status: SHIPPED | OUTPATIENT
Start: 2021-05-27

## 2021-05-27 RX ORDER — LEVOCETIRIZINE DIHYDROCHLORIDE 5 MG/1
5 TABLET, FILM COATED ORAL EVERY EVENING
Qty: 90 TABLET | Refills: 1 | Status: SHIPPED | OUTPATIENT
Start: 2021-05-27

## 2021-05-27 NOTE — PROGRESS NOTES
HPI:    Patient ID: Jen Patino is a 28year old male. HPI     Patient was last seen in 11/2019. He had history of GERD, herpes zoster. .  He had 16 pounds since last visit.   Stated that he would have abdominal fullness, diarrhea  and genital itchiness Alcohol use: No    Drug use: No       Review of Systems   Constitutional: Negative for activity change, appetite change, fatigue and fever. Weight loss   HENT: Negative for congestion. Eyes: Negative for visual disturbance.    Respiratory: Negativ Oropharynx is clear. Eyes:      General: No scleral icterus. Extraocular Movements: Extraocular movements intact. Pupils: Pupils are equal, round, and reactive to light. Cardiovascular:      Rate and Rhythm: Normal rate and regular rhythm. Prescriptions Disp Refills   • Montelukast Sodium 10 MG Oral Tab 90 tablet 1     Sig: Take 1 tablet (10 mg total) by mouth nightly. • Levocetirizine Dihydrochloride 5 MG Oral Tab 90 tablet 1     Sig: Take 1 tablet (5 mg total) by mouth every evening.    •

## 2021-05-29 PROBLEM — E55.9 VITAMIN D DEFICIENCY: Status: ACTIVE | Noted: 2021-05-29

## 2021-05-29 PROBLEM — D12.5 ADENOMATOUS POLYP OF SIGMOID COLON: Status: ACTIVE | Noted: 2021-05-29

## 2021-05-29 RX ORDER — ERGOCALCIFEROL 1.25 MG/1
50000 CAPSULE ORAL WEEKLY
Qty: 12 CAPSULE | Refills: 3 | Status: SHIPPED | OUTPATIENT
Start: 2021-05-29 | End: 2021-06-28

## 2021-05-29 RX ORDER — FLUTICASONE PROPIONATE 50 MCG
2 SPRAY, SUSPENSION (ML) NASAL DAILY
Qty: 3 EACH | Refills: 2 | Status: SHIPPED | OUTPATIENT
Start: 2021-05-29 | End: 2022-05-24

## 2021-05-29 NOTE — PATIENT INSTRUCTIONS
What Is Irritable Bowel Syndrome (IBS)? People who have irritable bowel syndrome (IBS)  have digestive tracts that react abnormally to certain substances or to stress. This leads to symptoms like cramps, gas, bloating, pain, constipation, and diarrhea. may help manage the symptoms. It may help your digestive tract work better. Your healthcare provider may prescribe one or more medicines for you.  Because some medicines may make IBS worse, don’t take any medicine, especially laxatives, unless your healthca

## 2022-09-20 ENCOUNTER — OFFICE VISIT (OUTPATIENT)
Dept: FAMILY MEDICINE CLINIC | Facility: CLINIC | Age: 37
End: 2022-09-20

## 2022-09-20 VITALS
DIASTOLIC BLOOD PRESSURE: 82 MMHG | TEMPERATURE: 99 F | WEIGHT: 147 LBS | BODY MASS INDEX: 21.05 KG/M2 | RESPIRATION RATE: 16 BRPM | SYSTOLIC BLOOD PRESSURE: 116 MMHG | OXYGEN SATURATION: 96 % | HEART RATE: 79 BPM | HEIGHT: 70 IN

## 2022-09-20 DIAGNOSIS — A60.00 GENITAL HERPES SIMPLEX, UNSPECIFIED SITE: Primary | ICD-10-CM

## 2022-09-20 DIAGNOSIS — N48.1 BALANITIS: ICD-10-CM

## 2022-09-20 DIAGNOSIS — K12.0 CANKER SORES ORAL: ICD-10-CM

## 2022-09-20 PROCEDURE — 3008F BODY MASS INDEX DOCD: CPT

## 2022-09-20 PROCEDURE — 3079F DIAST BP 80-89 MM HG: CPT

## 2022-09-20 PROCEDURE — 99203 OFFICE O/P NEW LOW 30 MIN: CPT

## 2022-09-20 PROCEDURE — 3074F SYST BP LT 130 MM HG: CPT

## 2022-09-20 RX ORDER — PANTOPRAZOLE SODIUM 20 MG/1
TABLET, DELAYED RELEASE ORAL
COMMUNITY
End: 2022-09-20 | Stop reason: ALTCHOICE

## 2022-09-20 RX ORDER — SUCRALFATE 1 G/1
TABLET ORAL
COMMUNITY
End: 2022-09-20 | Stop reason: ALTCHOICE

## 2022-09-20 RX ORDER — OMEPRAZOLE 20 MG/1
20 CAPSULE, DELAYED RELEASE ORAL AS DIRECTED
COMMUNITY
End: 2022-09-20 | Stop reason: ALTCHOICE

## 2022-09-20 RX ORDER — FLUCONAZOLE 150 MG/1
150 TABLET ORAL ONCE
Qty: 2 TABLET | Refills: 0 | Status: SHIPPED | OUTPATIENT
Start: 2022-09-20 | End: 2022-09-20

## 2022-09-20 RX ORDER — CLOTRIMAZOLE 1 %
1 CREAM (GRAM) TOPICAL 2 TIMES DAILY
COMMUNITY
Start: 2022-09-12

## 2022-09-20 RX ORDER — ACYCLOVIR 400 MG/1
400 TABLET ORAL 2 TIMES DAILY
Qty: 90 TABLET | Refills: 1 | Status: SHIPPED | OUTPATIENT
Start: 2022-09-20

## 2022-09-20 RX ORDER — GABAPENTIN 300 MG/1
1 CAPSULE ORAL AS DIRECTED
COMMUNITY
End: 2022-09-20 | Stop reason: ALTCHOICE

## 2022-09-20 RX ORDER — HYOSCYAMINE SULFATE 0.125 MG
TABLET ORAL
COMMUNITY
End: 2022-09-20 | Stop reason: ALTCHOICE

## 2022-09-23 LAB
HSV1 DNA SPEC QL NAA+PROBE: NEGATIVE
HSV2 DNA SPEC QL NAA+PROBE: NEGATIVE

## 2022-09-26 ENCOUNTER — TELEPHONE (OUTPATIENT)
Dept: FAMILY MEDICINE CLINIC | Facility: CLINIC | Age: 37
End: 2022-09-26

## 2022-09-26 NOTE — TELEPHONE ENCOUNTER
Patient called, verified Name and . He is calling to clarify how long is he supposed to take the acyclovir. It is ordered as Acyclovir 400 mg oral 2 times daily. He is also wondering if he has to test for HSV again and if it will result negative after treatment. He is asking because the facility where they are trying to do IVF is inquiring as well. Karan please advise on patient's duration of treatment.

## 2022-09-26 NOTE — TELEPHONE ENCOUNTER
Please inform him that he will have to take acyclovir from beginning of his wife's conception until after she has the baby. After the birth of his child, he can take acyclovir episodically. As discussed during the office visit, he is being treated with suppressive therapy. Therefore, he has to take the medication daily. 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 There is no cure, and the result will be positive again if tested. The best thing to do at this point is to take the suppressive therapy so he is not spreading infection to his wife unless she is already infected.

## 2022-10-12 ENCOUNTER — TELEPHONE (OUTPATIENT)
Dept: FAMILY MEDICINE CLINIC | Facility: CLINIC | Age: 37
End: 2022-10-12

## 2022-10-12 NOTE — TELEPHONE ENCOUNTER
Incoming fax from [facility] [place], requesting medical clearance for patient. Patient does not have pre-op appointment. Please help schedule with an appointment for clearance with Sofia VO.  Thank you

## 2022-10-14 ENCOUNTER — OFFICE VISIT (OUTPATIENT)
Dept: FAMILY MEDICINE CLINIC | Facility: CLINIC | Age: 37
End: 2022-10-14
Payer: COMMERCIAL

## 2022-10-14 VITALS
WEIGHT: 143 LBS | HEIGHT: 70 IN | DIASTOLIC BLOOD PRESSURE: 87 MMHG | SYSTOLIC BLOOD PRESSURE: 121 MMHG | BODY MASS INDEX: 20.47 KG/M2 | HEART RATE: 76 BPM

## 2022-10-14 DIAGNOSIS — Z31.83 PATIENT UNDERGOING IN VITRO FERTILIZATION: Primary | ICD-10-CM

## 2022-10-14 PROCEDURE — 3074F SYST BP LT 130 MM HG: CPT

## 2022-10-14 PROCEDURE — 3079F DIAST BP 80-89 MM HG: CPT

## 2022-10-14 PROCEDURE — 3008F BODY MASS INDEX DOCD: CPT

## 2022-10-14 PROCEDURE — 99213 OFFICE O/P EST LOW 20 MIN: CPT

## 2022-10-14 RX ORDER — TACROLIMUS 1 MG/G
1 OINTMENT TOPICAL 2 TIMES DAILY
COMMUNITY
Start: 2022-09-27

## 2022-11-28 ENCOUNTER — TELEPHONE (OUTPATIENT)
Dept: FAMILY MEDICINE CLINIC | Facility: CLINIC | Age: 37
End: 2022-11-28

## 2022-11-28 NOTE — TELEPHONE ENCOUNTER
Spoke to patient regarding pending labs to be done in order to complete forms for fertility medical clearance. Patient states forms are no longer needed and requested for them to be discarded.

## 2022-12-09 ENCOUNTER — OFFICE VISIT (OUTPATIENT)
Dept: FAMILY MEDICINE CLINIC | Facility: CLINIC | Age: 37
End: 2022-12-09
Payer: COMMERCIAL

## 2022-12-09 ENCOUNTER — HOSPITAL ENCOUNTER (OUTPATIENT)
Dept: GENERAL RADIOLOGY | Age: 37
Discharge: HOME | End: 2022-12-09
Payer: COMMERCIAL

## 2022-12-09 VITALS
DIASTOLIC BLOOD PRESSURE: 63 MMHG | BODY MASS INDEX: 21.05 KG/M2 | TEMPERATURE: 97 F | WEIGHT: 147 LBS | HEART RATE: 61 BPM | OXYGEN SATURATION: 98 % | RESPIRATION RATE: 16 BRPM | HEIGHT: 70 IN | SYSTOLIC BLOOD PRESSURE: 102 MMHG

## 2022-12-09 DIAGNOSIS — Z87.39 HISTORY OF HERNIATED INTERVERTEBRAL DISC: ICD-10-CM

## 2022-12-09 DIAGNOSIS — Z12.11 COLON CANCER SCREENING: ICD-10-CM

## 2022-12-09 DIAGNOSIS — M54.42 CHRONIC LEFT-SIDED LOW BACK PAIN WITH LEFT-SIDED SCIATICA: ICD-10-CM

## 2022-12-09 DIAGNOSIS — G89.29 CHRONIC LEFT-SIDED LOW BACK PAIN WITH LEFT-SIDED SCIATICA: ICD-10-CM

## 2022-12-09 DIAGNOSIS — R63.4 LOSS OF WEIGHT: Primary | ICD-10-CM

## 2022-12-09 PROBLEM — R68.81 EARLY SATIETY: Status: ACTIVE | Noted: 2022-05-05

## 2022-12-09 PROBLEM — R53.83 FATIGUE: Status: ACTIVE | Noted: 2022-05-05

## 2022-12-09 PROBLEM — M54.9 CHRONIC BACK PAIN: Status: ACTIVE | Noted: 2019-08-09

## 2022-12-09 PROBLEM — R68.89 ITCHY EYES: Status: ACTIVE | Noted: 2022-05-05

## 2022-12-09 PROBLEM — M54.2 NECK PAIN: Status: ACTIVE | Noted: 2022-09-12

## 2022-12-09 PROBLEM — M62.830 SPASM OF BACK MUSCLES: Status: ACTIVE | Noted: 2019-08-09

## 2022-12-09 PROBLEM — H57.9 ITCHY EYES: Status: ACTIVE | Noted: 2022-05-05

## 2022-12-09 PROCEDURE — 99213 OFFICE O/P EST LOW 20 MIN: CPT

## 2022-12-09 PROCEDURE — 72110 X-RAY EXAM L-2 SPINE 4/>VWS: CPT

## 2022-12-09 PROCEDURE — 3074F SYST BP LT 130 MM HG: CPT

## 2022-12-09 PROCEDURE — 3008F BODY MASS INDEX DOCD: CPT

## 2022-12-09 PROCEDURE — 3078F DIAST BP <80 MM HG: CPT

## 2022-12-09 RX ORDER — FLUCONAZOLE 150 MG/1
150 TABLET ORAL
COMMUNITY
Start: 2022-09-20 | End: 2022-12-09 | Stop reason: ALTCHOICE

## 2022-12-09 RX ORDER — CELECOXIB 200 MG/1
200 CAPSULE ORAL 2 TIMES DAILY
Qty: 14 CAPSULE | Refills: 0 | Status: SHIPPED | OUTPATIENT
Start: 2022-12-09

## 2022-12-21 RX ORDER — CLOTRIMAZOLE 1 %
1 CREAM (GRAM) TOPICAL 2 TIMES DAILY
Qty: 1 EACH | Refills: 1 | Status: SHIPPED | OUTPATIENT
Start: 2022-12-21

## 2022-12-23 PROBLEM — M51.36 LUMBAR DEGENERATIVE DISC DISEASE: Status: ACTIVE | Noted: 2022-12-23

## 2023-03-15 ENCOUNTER — TELEPHONE (OUTPATIENT)
Dept: FAMILY MEDICINE CLINIC | Facility: CLINIC | Age: 38
End: 2023-03-15

## 2023-03-16 NOTE — TELEPHONE ENCOUNTER
Pineda Jurado: please advise if patient can have a larger qty with refill. Patient called. He has tried a trial of celebrex in December for his back and helped his pains. He would like a RX. Pended RX.

## 2023-03-17 RX ORDER — CELECOXIB 200 MG/1
200 CAPSULE ORAL 2 TIMES DAILY PRN
Qty: 14 CAPSULE | Refills: 2 | Status: SHIPPED | OUTPATIENT
Start: 2023-03-17

## 2023-03-17 NOTE — TELEPHONE ENCOUNTER
Advise patient that medication should only be taken as needed and not everyday. Do not take Ibuprofen with medication.    Script sent to pharmacy

## 2023-09-12 ENCOUNTER — OFFICE VISIT (OUTPATIENT)
Dept: FAMILY MEDICINE CLINIC | Facility: CLINIC | Age: 38
End: 2023-09-12

## 2023-09-12 ENCOUNTER — LAB ENCOUNTER (OUTPATIENT)
Dept: LAB | Age: 38
End: 2023-09-12
Payer: COMMERCIAL

## 2023-09-12 VITALS
RESPIRATION RATE: 16 BRPM | SYSTOLIC BLOOD PRESSURE: 118 MMHG | BODY MASS INDEX: 22.07 KG/M2 | DIASTOLIC BLOOD PRESSURE: 72 MMHG | OXYGEN SATURATION: 99 % | WEIGHT: 154.19 LBS | HEIGHT: 70 IN | HEART RATE: 61 BPM | TEMPERATURE: 97 F

## 2023-09-12 DIAGNOSIS — M54.50 LUMBAR BACK PAIN: ICD-10-CM

## 2023-09-12 DIAGNOSIS — B00.1 COLD SORE: ICD-10-CM

## 2023-09-12 DIAGNOSIS — R10.13 EPIGASTRIC PAIN: Primary | ICD-10-CM

## 2023-09-12 DIAGNOSIS — R10.13 EPIGASTRIC PAIN: ICD-10-CM

## 2023-09-12 DIAGNOSIS — B37.81 THRUSH OF MOUTH AND ESOPHAGUS: ICD-10-CM

## 2023-09-12 DIAGNOSIS — B37.0 THRUSH OF MOUTH AND ESOPHAGUS: ICD-10-CM

## 2023-09-12 LAB
ALBUMIN SERPL-MCNC: 4 G/DL (ref 3.4–5)
ALBUMIN/GLOB SERPL: 1.3 {RATIO} (ref 1–2)
ALP LIVER SERPL-CCNC: 83 U/L
ALT SERPL-CCNC: 40 U/L
ANION GAP SERPL CALC-SCNC: 3 MMOL/L (ref 0–18)
AST SERPL-CCNC: 21 U/L (ref 15–37)
BASOPHILS # BLD AUTO: 0.03 X10(3) UL (ref 0–0.2)
BASOPHILS NFR BLD AUTO: 0.5 %
BILIRUB SERPL-MCNC: 0.9 MG/DL (ref 0.1–2)
BUN BLD-MCNC: 18 MG/DL (ref 7–18)
BUN/CREAT SERPL: 19.8 (ref 10–20)
CALCIUM BLD-MCNC: 8.9 MG/DL (ref 8.5–10.1)
CHLORIDE SERPL-SCNC: 108 MMOL/L (ref 98–112)
CO2 SERPL-SCNC: 30 MMOL/L (ref 21–32)
CREAT BLD-MCNC: 0.91 MG/DL
DEPRECATED RDW RBC AUTO: 40.4 FL (ref 35.1–46.3)
EGFRCR SERPLBLD CKD-EPI 2021: 111 ML/MIN/1.73M2 (ref 60–?)
EOSINOPHIL # BLD AUTO: 0.26 X10(3) UL (ref 0–0.7)
EOSINOPHIL NFR BLD AUTO: 4.4 %
ERYTHROCYTE [DISTWIDTH] IN BLOOD BY AUTOMATED COUNT: 12.2 % (ref 11–15)
FASTING STATUS PATIENT QL REPORTED: NO
GLOBULIN PLAS-MCNC: 3.1 G/DL (ref 2.8–4.4)
GLUCOSE BLD-MCNC: 89 MG/DL (ref 70–99)
HCT VFR BLD AUTO: 41.8 %
HGB BLD-MCNC: 14.5 G/DL
IMM GRANULOCYTES # BLD AUTO: 0.01 X10(3) UL (ref 0–1)
IMM GRANULOCYTES NFR BLD: 0.2 %
LIPASE SERPL-CCNC: 43 U/L (ref 13–75)
LYMPHOCYTES # BLD AUTO: 1.44 X10(3) UL (ref 1–4)
LYMPHOCYTES NFR BLD AUTO: 24.3 %
MCH RBC QN AUTO: 31.5 PG (ref 26–34)
MCHC RBC AUTO-ENTMCNC: 34.7 G/DL (ref 31–37)
MCV RBC AUTO: 90.7 FL
MONOCYTES # BLD AUTO: 0.44 X10(3) UL (ref 0.1–1)
MONOCYTES NFR BLD AUTO: 7.4 %
NEUTROPHILS # BLD AUTO: 3.74 X10 (3) UL (ref 1.5–7.7)
NEUTROPHILS # BLD AUTO: 3.74 X10(3) UL (ref 1.5–7.7)
NEUTROPHILS NFR BLD AUTO: 63.2 %
OSMOLALITY SERPL CALC.SUM OF ELEC: 293 MOSM/KG (ref 275–295)
PLATELET # BLD AUTO: 186 10(3)UL (ref 150–450)
POTASSIUM SERPL-SCNC: 4.1 MMOL/L (ref 3.5–5.1)
PROT SERPL-MCNC: 7.1 G/DL (ref 6.4–8.2)
RBC # BLD AUTO: 4.61 X10(6)UL
SODIUM SERPL-SCNC: 141 MMOL/L (ref 136–145)
WBC # BLD AUTO: 5.9 X10(3) UL (ref 4–11)

## 2023-09-12 PROCEDURE — 36415 COLL VENOUS BLD VENIPUNCTURE: CPT

## 2023-09-12 PROCEDURE — 80053 COMPREHEN METABOLIC PANEL: CPT

## 2023-09-12 PROCEDURE — 3008F BODY MASS INDEX DOCD: CPT

## 2023-09-12 PROCEDURE — 99213 OFFICE O/P EST LOW 20 MIN: CPT

## 2023-09-12 PROCEDURE — 3078F DIAST BP <80 MM HG: CPT

## 2023-09-12 PROCEDURE — 3074F SYST BP LT 130 MM HG: CPT

## 2023-09-12 PROCEDURE — 83013 H PYLORI (C-13) BREATH: CPT

## 2023-09-12 PROCEDURE — 83690 ASSAY OF LIPASE: CPT

## 2023-09-12 PROCEDURE — 85025 COMPLETE CBC W/AUTO DIFF WBC: CPT

## 2023-09-12 RX ORDER — PANTOPRAZOLE SODIUM 20 MG/1
20 TABLET, DELAYED RELEASE ORAL
Qty: 14 TABLET | Refills: 0 | Status: SHIPPED | OUTPATIENT
Start: 2023-09-12

## 2023-09-12 RX ORDER — ACYCLOVIR 400 MG/1
400 TABLET ORAL DAILY
Qty: 5 TABLET | Refills: 1 | Status: SHIPPED | OUTPATIENT
Start: 2023-09-12 | End: 2023-09-17

## 2023-09-14 LAB — H PYLORI BREATH TEST: NEGATIVE

## 2023-09-18 ENCOUNTER — TELEPHONE (OUTPATIENT)
Dept: FAMILY MEDICINE CLINIC | Facility: CLINIC | Age: 38
End: 2023-09-18

## 2023-09-18 NOTE — TELEPHONE ENCOUNTER
Spoke with pt who states that he had an x-ray done last year and did not recall TAVO Jamison thoughts of x-ray. Alfonzo VO recommendation was physical therapy but pt denies starting that. Alfonzo VO please advise on x-ray results on 12/9/22.

## 2023-09-19 NOTE — TELEPHONE ENCOUNTER
Gordy Woody, APRN  12/15/2022  5:27 PM CST       Advise patient that lumbar x-ray shows degenerative changes of the lumbar spine, L5-S1. Not sure if this has worsened since last x-ray in 2015 since we do not have imaging report available. If patient still having persistent pain I can place a referral for physiatry.    No changes in recommendations given on 12/15/22

## 2023-09-20 NOTE — TELEPHONE ENCOUNTER
Patient should have enough of Nystatin for the full 7 days unless he was using more than directed. If still having issues with thrush, may need oral fluconazole instead.

## 2023-09-20 NOTE — TELEPHONE ENCOUNTER
nystatin [id number] UNIT/ML Mouth/Throat Suspension 140 mL 0 9/12/2023 9/19/2023    Sig - Route: Take 5 mL (500,000 Units total) by mouth 4 (four) times daily for 7 days. - Oral    Sent to pharmacy as: Nystatin [id number] UNIT/ML Mouth/Throat Suspension (Mycostatin)    E-Prescribing Status: Receipt confirmed by pharmacy (9/12/2023  4:37 PM CDT)      Patient contacted and made aware of TAVO Kilgore's interpretation and recommendations. He states the medication [above] has helped, but he stills sees the white patches on tongue. He states the white patches are proportional to his sugar intake daily, if he has more sugar intake then the amount of white patches increase. He states he is on his 3rd day of treatment, but the symptoms persists - he states the pain has subsided. He will continue to take and monitor progress. He states that in the past he did not respond effectively to Fluconazole. He would prefer to stay on Nystatin; he confirmed he is taking as prescribed above. He has scraped his tongue with spoon every day and the white patches return the next day, he states he is gentle while scraping. I made him aware there are OTC tongue  that would be more appropriate, plus scraping can cause bleeding and increased risk for further infection. He also mentioned the white patches/substance is all over in his oral cavity and when he brushes his teeth he also sees it coming off. I made him aware I will convey the above to TAVO Kilgore. Patient verbalized understanding. No further questions or concerns at this time.     TAVO Kilgore to advise  Please reply to pool: DORA Hughes

## 2023-09-20 NOTE — TELEPHONE ENCOUNTER
Verified name and . Patient was advised of TAVO Huitron's message. Patient verbalizes understanding and agrees with plan. He is asking for a refill on the Nystatin (prescription sent 23) as he states he does not believe he will have enough to cover for the whole 7 days as directed in prescription instructions. He states that this medicine is helping greatly.     Medication pended for your review and approval.

## 2023-11-21 ENCOUNTER — NURSE TRIAGE (OUTPATIENT)
Dept: FAMILY MEDICINE CLINIC | Facility: CLINIC | Age: 38
End: 2023-11-21

## 2023-11-21 RX ORDER — ACYCLOVIR 400 MG/1
400 TABLET ORAL DAILY
Qty: 5 TABLET | Refills: 2 | Status: SHIPPED | OUTPATIENT
Start: 2023-11-21 | End: 2023-11-26

## 2023-11-21 NOTE — TELEPHONE ENCOUNTER
Action Requested: Summary for Provider     []  Critical Lab, Recommendations Needed  [] Need Additional Advice  []   FYI    []   Need Orders  [x] Need Medications Sent to Pharmacy  []  Other     SUMMARY: Patient reports of a cold sore on the upper lip that started on Friday. States that Taryn Ojeda gave him a prescription of acyclovir with 1 refill in September as he gets intermittent cold sores. He already finished the refill already and is requesting for another refill since he is traveling next week. Denies any other complaints at this time. Taryn Ojeda please advise.      Reason for call: Mouth Cold Sores  Onset: 11/17    Reason for Disposition   Cold sores without complications    Protocols used: Cold Sores (Fever Blisters)-A-OH

## 2023-11-21 NOTE — TELEPHONE ENCOUNTER
Medication Detail    Medication Quantity Refills Start End   acyclovir 400 MG Oral Tab 5 tablet 2 11/21/2023 11/26/2023   Sig:   Take 1 tablet (400 mg total) by mouth daily for 5 days. Route:   Oral     Order #:   [id number]     Script sent to pharmacy. Please notify patient.

## 2023-12-08 ENCOUNTER — NURSE TRIAGE (OUTPATIENT)
Dept: FAMILY MEDICINE CLINIC | Facility: CLINIC | Age: 38
End: 2023-12-08

## 2023-12-08 ENCOUNTER — HOSPITAL ENCOUNTER (OUTPATIENT)
Dept: GENERAL RADIOLOGY | Age: 38
Discharge: HOME | End: 2023-12-08
Attending: FAMILY MEDICINE
Payer: COMMERCIAL

## 2023-12-08 ENCOUNTER — OFFICE VISIT (OUTPATIENT)
Dept: FAMILY MEDICINE CLINIC | Facility: CLINIC | Age: 38
End: 2023-12-08
Payer: COMMERCIAL

## 2023-12-08 VITALS
DIASTOLIC BLOOD PRESSURE: 75 MMHG | BODY MASS INDEX: 21.33 KG/M2 | SYSTOLIC BLOOD PRESSURE: 109 MMHG | HEIGHT: 70 IN | RESPIRATION RATE: 16 BRPM | WEIGHT: 149 LBS | HEART RATE: 66 BPM | OXYGEN SATURATION: 98 %

## 2023-12-08 DIAGNOSIS — R06.02 SOB (SHORTNESS OF BREATH): ICD-10-CM

## 2023-12-08 DIAGNOSIS — R05.1 ACUTE COUGH: ICD-10-CM

## 2023-12-08 DIAGNOSIS — U07.1 COVID: Primary | ICD-10-CM

## 2023-12-08 PROCEDURE — 71046 X-RAY EXAM CHEST 2 VIEWS: CPT | Performed by: FAMILY MEDICINE

## 2023-12-08 RX ORDER — CODEINE PHOSPHATE AND GUAIFENESIN 10; 100 MG/5ML; MG/5ML
10 SOLUTION ORAL 3 TIMES DAILY PRN
Qty: 237 ML | Refills: 0 | Status: SHIPPED | OUTPATIENT
Start: 2023-12-08 | End: 2023-12-18

## 2023-12-08 RX ORDER — ALBUTEROL SULFATE 90 UG/1
2 AEROSOL, METERED RESPIRATORY (INHALATION) EVERY 4 HOURS PRN
Qty: 18 G | Refills: 0 | Status: SHIPPED | OUTPATIENT
Start: 2023-12-08

## 2023-12-08 NOTE — TELEPHONE ENCOUNTER
Patient seeking appt today. He states he was exposed to sick people on 11/28; He tested 11/30/23 positive for Covid. Fever for 2 days, coughing; today cough is worse. When breaths in, can feel something in chest.   Has hard time breathing due to coughing constantly. Denied runny nose. No fever today. Patient was given appt today at 12:30pm with Dr. Haley Ware.      Reason for Disposition   Continuous (nonstop) coughing interferes with work or school and no improvement using cough treatment per Care Advice    Protocols used: Cough-A-OH

## 2024-01-18 PROBLEM — J84.10 CALCIFIED GRANULOMA OF LUNG (HCC): Status: ACTIVE | Noted: 2024-01-18

## 2024-05-24 ENCOUNTER — HOSPITAL ENCOUNTER (OUTPATIENT)
Dept: GENERAL RADIOLOGY | Age: 39
Discharge: HOME | End: 2024-05-24

## 2024-05-24 ENCOUNTER — OFFICE VISIT (OUTPATIENT)
Dept: FAMILY MEDICINE CLINIC | Facility: CLINIC | Age: 39
End: 2024-05-24

## 2024-05-24 ENCOUNTER — LAB ENCOUNTER (OUTPATIENT)
Dept: LAB | Age: 39
End: 2024-05-24

## 2024-05-24 VITALS
HEIGHT: 70 IN | SYSTOLIC BLOOD PRESSURE: 118 MMHG | RESPIRATION RATE: 16 BRPM | WEIGHT: 153 LBS | OXYGEN SATURATION: 97 % | TEMPERATURE: 97 F | DIASTOLIC BLOOD PRESSURE: 86 MMHG | BODY MASS INDEX: 21.9 KG/M2 | HEART RATE: 66 BPM

## 2024-05-24 DIAGNOSIS — M25.531 RIGHT WRIST PAIN: ICD-10-CM

## 2024-05-24 DIAGNOSIS — M25.40 PAINFUL SWELLING OF JOINT: ICD-10-CM

## 2024-05-24 DIAGNOSIS — M25.531 RIGHT WRIST PAIN: Primary | ICD-10-CM

## 2024-05-24 DIAGNOSIS — S69.91XA INJURY OF FINGER OF RIGHT HAND, INITIAL ENCOUNTER: ICD-10-CM

## 2024-05-24 LAB — RHEUMATOID FACT SERPL-ACNC: <3.5 IU/ML (ref ?–14)

## 2024-05-24 PROCEDURE — 3079F DIAST BP 80-89 MM HG: CPT

## 2024-05-24 PROCEDURE — L3807 WHFO W/O JOINTS PRE CST: HCPCS

## 2024-05-24 PROCEDURE — 86225 DNA ANTIBODY NATIVE: CPT

## 2024-05-24 PROCEDURE — 86431 RHEUMATOID FACTOR QUANT: CPT

## 2024-05-24 PROCEDURE — 3008F BODY MASS INDEX DOCD: CPT

## 2024-05-24 PROCEDURE — 73140 X-RAY EXAM OF FINGER(S): CPT

## 2024-05-24 PROCEDURE — 73100 X-RAY EXAM OF WRIST: CPT

## 2024-05-24 PROCEDURE — 99213 OFFICE O/P EST LOW 20 MIN: CPT

## 2024-05-24 PROCEDURE — 86038 ANTINUCLEAR ANTIBODIES: CPT

## 2024-05-24 PROCEDURE — 36415 COLL VENOUS BLD VENIPUNCTURE: CPT

## 2024-05-24 PROCEDURE — 3074F SYST BP LT 130 MM HG: CPT

## 2024-05-24 RX ORDER — CELECOXIB 200 MG/1
200 CAPSULE ORAL 2 TIMES DAILY
Qty: 14 CAPSULE | Refills: 0 | Status: SHIPPED | OUTPATIENT
Start: 2024-05-24

## 2024-05-24 NOTE — PROGRESS NOTES
HPI:    Patient ID: J Luis Barajas is a 38 year old male.    HPI     Patient here in office with complaint of swelling/pain, currently rated 0/10 (can go up to 7/10 when pain present), to right hand thumb/wrist, started 2 weeks ago after patient was playing tennis.  Also complains of decreased range of motion.  Has tried pain patch with no improvement in pain/swelling.  Reports similar history of finger swelling 5 years ago and right hand fifth digit (bone possibly misaligned).  Concerned about inflammatory disorder.      Review of Systems   Constitutional: Negative.    Respiratory: Negative.     Cardiovascular: Negative.    Musculoskeletal:         Right hand thumb injury   Skin: Negative.    Neurological: Negative.    Psychiatric/Behavioral: Negative.              Current Outpatient Medications   Medication Sig Dispense Refill    celecoxib (CELEBREX) 200 MG Oral Cap Take 1 capsule (200 mg total) by mouth 2 (two) times daily. 14 capsule 0    Spacer/Aero-Holding Chambers Does not apply Device To be used with albuterol inhaler 1 each 0    Multiple Vitamins-Minerals (MENS MULTIVITAMIN OR) Take by mouth.      albuterol 108 (90 Base) MCG/ACT Inhalation Aero Soln Inhale 2 puffs into the lungs every 4 (four) hours as needed. (Patient not taking: Reported on 5/24/2024) 18 g 0     Allergies:  Allergies   Allergen Reactions    Lactose DIARRHEA      /86   Pulse 66   Temp 97.2 °F (36.2 °C) (Temporal)   Resp 16   Ht 5' 10\" (1.778 m)   Wt 153 lb (69.4 kg)   SpO2 97%   BMI 21.95 kg/m²   Body mass index is 21.95 kg/m².  PHYSICAL EXAM:   Physical Exam  Vitals reviewed.   Constitutional:       General: He is not in acute distress.     Appearance: Normal appearance. He is not ill-appearing.   Pulmonary:      Effort: Pulmonary effort is normal. No respiratory distress.   Musculoskeletal:         General: Swelling, tenderness and signs of injury present. Normal range of motion.      Comments: Right thumb swelling/tenderness  with decreased ROM.  Right wrist tenderness   Skin:     General: Skin is warm.      Findings: No rash.   Neurological:      General: No focal deficit present.      Mental Status: He is alert and oriented to person, place, and time.   Psychiatric:         Mood and Affect: Mood normal.         Behavior: Behavior normal.         Thought Content: Thought content normal.         Judgment: Judgment normal.                ASSESSMENT/PLAN:   1. Right wrist pain  - XR WRIST (2 VIEWS), RIGHT (CPT=73100); Future    2. Injury of finger of right hand, initial encounter  - celecoxib (CELEBREX) 200 MG Oral Cap, Take 1 capsule (200 mg total) by mouth 2 (two) times daily.   - XR FINGER(S) (MIN 2 VIEWS), RIGHT THUMB (CPT=73140); Future  -Patient given wrist brace with thumb abductor.  Advise to wear during the day as tolerated and wear during the night for 1 week    3. Painful swelling of joint  - Connective Tissue Disease (BALDO) Screen [E]; Future  - Rheumatoid Arthritis Factor [E]; Future      Orders Placed This Encounter   Procedures    Connective Tissue Disease (BALDO) Screen [E]    Rheumatoid Arthritis Factor [E]       Meds This Visit:  Requested Prescriptions     Signed Prescriptions Disp Refills    celecoxib (CELEBREX) 200 MG Oral Cap 14 capsule 0     Sig: Take 1 capsule (200 mg total) by mouth 2 (two) times daily.       Imaging & Referrals:  None         ID#[id number]

## 2024-05-28 LAB
DSDNA IGG SERPL IA-ACNC: 1.5 IU/ML
ENA AB SER QL IA: 0.3 UG/L
ENA AB SER QL IA: NEGATIVE

## 2024-06-13 ENCOUNTER — OFFICE VISIT (OUTPATIENT)
Dept: ORTHOPEDICS CLINIC | Facility: CLINIC | Age: 39
End: 2024-06-13
Payer: COMMERCIAL

## 2024-06-13 VITALS — HEIGHT: 70 IN | BODY MASS INDEX: 21.9 KG/M2 | WEIGHT: 153 LBS

## 2024-06-13 DIAGNOSIS — M65.311 TRIGGER FINGER OF RIGHT THUMB: Primary | ICD-10-CM

## 2024-06-13 PROCEDURE — 3008F BODY MASS INDEX DOCD: CPT | Performed by: ORTHOPAEDIC SURGERY

## 2024-06-13 PROCEDURE — 99204 OFFICE O/P NEW MOD 45 MIN: CPT | Performed by: ORTHOPAEDIC SURGERY

## 2024-06-13 NOTE — H&P
Clinic Note       Assessment/Plan:  38 year old with     Triggering of right thumb-  I discussed with the patient surgical and non-surgical treatment options and their success rate/risks. Using a shared decision-making process, patient elected to monitor his symptoms.  We did have a at length discussion regarding risk factors for trigger thumb which include statin medication hypothyroidism diabetes and breast cancer medication anastrozole.  I did reassure the patient that the consumption of cake has never been linked to the development of a trigger thumb nor would eating cake trigger a immune response to result in a trigger thumb.  Other nonsurgical treatment options include splinting and anti-inflammatory medication although the evidence for its effectiveness is less than steroid injection was offered as alternative options however the patient deferred.    Follow Up: As needed    Diagnostics  X-ray right thumb 3 views: No fractures dislocations or osseous abnormalities    Physical Exam:    Ht 5' 10\" (1.778 m)   Wt 153 lb (69.4 kg)   BMI 21.95 kg/m²     Right Upper Extremity:   Inspection: Skin Intact. No skin lesions. No gross deformity.   Palpation:  (+) TTP over A1 pulley   Motion: Elbow: normal bilateral symmetric ext/flex  Wrist: normal bilateral symmetric ext/flex/sup/pro  Finger: Patient lacks full thumb IP flexion when compared to the contralateral side   Special Tests: (-) active triggering/subtle catch.  (-) IPJ contracture. Normally sensate digit. Normally perfused digit.     CC: Triggering of right thumb    HPI: 38 year old right-hand-dominant male presents with difficulty fully flexing the thumb.  His pain is moderate.  Describes the pain as locking.  He does have pain over the A1 pulley.  He is concerned that this occurred 1 month ago after he ate a large cake and it somehow triggered his immune system    Occupation: IT    Past Medical History:    Herpes zoster    10 yrs ago     Past Surgical  History:   Procedure Laterality Date    Colonoscopy  12/16/2015    Dr. Dwayne Guillaume: small sigmoid adenoma, internal hemorrhoids - 5 year recall    Egd  12/16/2015    Dr. Dwayne Guillaume: EGD normal (no biopsy taken)     Current Outpatient Medications   Medication Sig Dispense Refill    celecoxib (CELEBREX) 200 MG Oral Cap Take 1 capsule (200 mg total) by mouth 2 (two) times daily. 14 capsule 0    Spacer/Aero-Holding Chambers Does not apply Device To be used with albuterol inhaler 1 each 0    Multiple Vitamins-Minerals (MENS MULTIVITAMIN OR) Take by mouth.      albuterol 108 (90 Base) MCG/ACT Inhalation Aero Soln Inhale 2 puffs into the lungs every 4 (four) hours as needed. (Patient not taking: Reported on 5/24/2024) 18 g 0     Allergies   Allergen Reactions    Lactose DIARRHEA     Family History   Problem Relation Age of Onset    Hypertension Father     Lipids Father     Other (gallstone) Father     Hypertension Mother     Lipids Mother      Social History     Occupational History    Not on file   Tobacco Use    Smoking status: Never    Smokeless tobacco: Never   Vaping Use    Vaping status: Never Used   Substance and Sexual Activity    Alcohol use: No    Drug use: No    Sexual activity: Not Currently        Review of Systems (negative unless bolded):  General: fevers, chills, fatigue  CV:  chest pain, palpitations, leg swelling  Msk: bodyaches, neck pain, neck stiffness  Skin: rashes, open wounds, nonhealing ulcers  Hem: bleeds easily, bruise easily, immunocompromised  Neuro: dizziness, light headedness, headaches  Psych: anxious, depressed, anger issues    Artemio Malcolm MD   Hand, Wrist, & Elbow Surgery  [email or web address]  t: [phone]  f: [phone]

## 2025-03-09 NOTE — TELEPHONE ENCOUNTER
's office has to call [employer] [facility] ([phone]) to request for an appeal and then inform them that the original procedure will no longer be performed . Ins person to contact is:  Yari at [phone]/ reference  # [id number] . chest pain/complains of pain/discomfort

## (undated) NOTE — LETTER
September 11, 2018         Jean-Claude Naylor MD  [address]      Patient: Zee Gaines   YOB: 1985   Date of Visit: 9/11/2018       Dear Dr. John Sheikh MD,    I saw your patient, Zee Gaines, on 9/11/2018.

## (undated) NOTE — MR AVS SNAPSHOT
After Visit Summary   3/24/2020    Zina Gauthier    MRN: [id number]           Visit Information     Date & Time  3/24/2020  6:05 PM Provider  [facility]  Phone  [phone]      Allergies as of [facility] using your mobile device or computer   using [address] with a [facility] Physician or [facility] online. The physician will respond and provide   a treatment plan within a few hours.  ONLINE VISIT  Primary Care Providers  Treatment for mild il